# Patient Record
Sex: MALE | Race: WHITE | Employment: UNEMPLOYED | URBAN - METROPOLITAN AREA
[De-identification: names, ages, dates, MRNs, and addresses within clinical notes are randomized per-mention and may not be internally consistent; named-entity substitution may affect disease eponyms.]

---

## 2020-06-22 ENCOUNTER — HOSPITAL ENCOUNTER (EMERGENCY)
Facility: CLINIC | Age: 30
Discharge: HOME | End: 2020-06-22
Attending: FAMILY MEDICINE | Admitting: FAMILY MEDICINE
Payer: COMMERCIAL

## 2020-06-22 VITALS
DIASTOLIC BLOOD PRESSURE: 95 MMHG | HEART RATE: 93 BPM | OXYGEN SATURATION: 96 % | SYSTOLIC BLOOD PRESSURE: 119 MMHG | RESPIRATION RATE: 18 BRPM | WEIGHT: 155 LBS | BODY MASS INDEX: 21.02 KG/M2 | TEMPERATURE: 98.1 F

## 2020-06-22 DIAGNOSIS — K04.7 TOOTH ABSCESS: ICD-10-CM

## 2020-06-22 PROCEDURE — 99284 EMERGENCY DEPT VISIT MOD MDM: CPT | Mod: Z6 | Performed by: FAMILY MEDICINE

## 2020-06-22 PROCEDURE — 99282 EMERGENCY DEPT VISIT SF MDM: CPT | Performed by: FAMILY MEDICINE

## 2020-06-22 RX ORDER — IBUPROFEN 200 MG
600 TABLET ORAL EVERY 6 HOURS PRN
Refills: 0 | COMMUNITY
Start: 2020-06-22

## 2020-06-22 RX ORDER — ACETAMINOPHEN 500 MG
1000 TABLET ORAL EVERY 6 HOURS PRN
Qty: 100 TABLET | Refills: 11 | COMMUNITY
Start: 2020-06-22

## 2020-06-22 RX ORDER — OXYCODONE HYDROCHLORIDE 5 MG/1
5-10 TABLET ORAL EVERY 4 HOURS PRN
Qty: 12 TABLET | Refills: 0 | Status: SHIPPED | OUTPATIENT
Start: 2020-06-22

## 2020-06-22 NOTE — ED AVS SNAPSHOT
[facility] Emergency Department  [address] [place] [address]  Phone:  [phone]  Fax:  [phone]                                    Paul Guzmán   MRN: [id number]    Department:  [facility] Emergency Department   Date of Visit:  6/22/2020           After Visit Summary Signature Page    I have received my discharge instructions, and my questions have been answered. I have discussed any challenges I see with this plan with the nurse or doctor.    ..........................................................................................................................................  Patient/Patient Representative Signature      ..........................................................................................................................................  Patient Representative Print Name and Relationship to Patient    ..................................................               ................................................  Date                                   Time    ..........................................................................................................................................  Reviewed by Signature/Title    ...................................................              ..............................................  Date                                               Time          22EPIC Rev 08/18

## 2020-06-23 NOTE — ED NOTES
Reviewed discharge instruction, verbalized understanding. No questions or concerns. Meds reviewed.

## 2020-06-23 NOTE — DISCHARGE INSTRUCTIONS
Take the Augmentin twice a day as directed for the infection.  Ibuprofen 600 mg and Tylenol 1000 mg every 6 hours as needed for pain.  You can take them at the same time.  Take with food so the Ibuprofen doesn't upset your stomach.  You may use the oxycodone sparingly for more severe pain.  All narcotics can cause drowsiness and constipation so be careful to avoid those problems.  Take an OTC stool softener if needed.  Narcotics also have addictive potential and can actually make you more sensitive to pain in as little as 3 days so only use them for a very short time.  You should not drive or operate machinery while taking narcotics.    See a dentist as soon as possible.  Return to the ED if worse/concerns.  It was nice visiting with you tonight.   I hope you feel better soon.     Thank you for choosing [facility]. We appreciate the opportunity to meet your urgent medical needs. Please let us know if we could have done anything to make your stay more satisfying.    After discharge, please closely monitor for any new or worsening symptoms. Return to the Emergency Department if you develop any acute worsening signs or symptoms.    If you had lab work, cultures or imaging studies done during your stay, the final results may still be pending. We will call you if your plan of care needs to change. However, if you are not improving as expected, please follow up with your primary care provider or clinic.     Start any prescription medications that were prescribed to you and take them as directed.     Please see additional handouts that may be pertinent to your condition.

## 2020-06-23 NOTE — ED PROVIDER NOTES
History     Chief Complaint   Patient presents with     Facial Swelling     HPI  Paul Guzmán is a 30 year old male who presents to the ED this evening with right lower jaw swelling.  He noticed that yesterday morning.  It was worse this morning and has progressed throughout the day.  His teeth are in poor repair.  He tried calling to get a dentist appointment all day long but was unsuccessful.  He denies any fevers chills or sweats.  No drainage.  Has not been able to sleep tonight because of the discomfort.  Is hoping to get started on an antibiotic.  Has been icing all day.      Allergies:  Allergies   Allergen Reactions     No Known Drug Allergies        Problem List:    Patient Active Problem List    Diagnosis Date Noted     Calcaneus fracture 05/22/2013     Priority: Medium     CARDIOVASCULAR SCREENING; LDL GOAL LESS THAN 160 10/31/2010     Priority: Medium     Closed fracture of shaft of clavicle 05/25/2006     Priority: Medium     Closed fracture of five ribs 05/25/2006     Priority: Medium     Juvenile osteochondrosis of lower extremity, excluding foot 10/27/2005     Priority: Medium     Other behavioral problems 02/25/2004     Priority: Medium     Problem list name updated by automated process. Provider to review and confirm       Attention deficit hyperactivity disorder (ADHD) 03/17/2003     Priority: Medium     Problem list name updated by automated process. Provider to review          Past Medical History:    Past Medical History:   Diagnosis Date     Closed fracture of rib(s), unspecified      Unspecified part of closed fracture of clavicle 05/22/06       Past Surgical History:    Past Surgical History:   Procedure Laterality Date     C REMOVAL ADENOIDS,PRIMARY,<11 Y/O       HC CREATE EARDRUM OPENING,GEN ANESTH      P.E. Tubes       Family History:    No family history on file.    Social History:  Marital Status:  Single [1]  Social History     Tobacco Use     Smoking status: Current Every Day  Smoker     Packs/day: 0.50     Smokeless tobacco: Never Used   Substance Use Topics     Alcohol use: Yes     Alcohol/week: 0.0 standard drinks     Types: 3 - 4 Cans of beer per week     Drug use: No        Medications:    acetaminophen (TYLENOL) 500 MG tablet  amoxicillin-clavulanate (AUGMENTIN) 875-125 MG tablet  ibuprofen (ADVIL/MOTRIN) 200 MG tablet  oxyCODONE (ROXICODONE) 5 MG tablet          Review of Systems   All other systems reviewed and are negative.      Physical Exam   BP: (!) 119/95  Pulse: 93  Temp: 98.1  F (36.7  C)  Resp: 18  Weight: 70.3 kg (155 lb)  SpO2: 96 %      Physical Exam  Constitutional:       General: He is in acute distress (mild).   HENT:      Head:      Jaw: Swelling (right lower, localized, not over salivary glands) present.      Salivary Glands: Right salivary gland is not diffusely enlarged or tender. Left salivary gland is not diffusely enlarged or tender.        Mouth/Throat:      Dentition: Dental caries (extensive, especially at gum line) and dental abscesses (tender/swelling right lower) present.   Pulmonary:      Effort: Pulmonary effort is normal. No respiratory distress.   Skin:     General: Skin is warm and dry.   Neurological:      General: No focal deficit present.      Mental Status: He is alert and oriented to person, place, and time.   Psychiatric:         Mood and Affect: Mood normal.         Behavior: Behavior normal.         ED Course  (with Medical Decision Making)      30-year-old gentleman with right lower jaw swelling over the past couple of days.  Has extensive dental decay especially at the gumline.  No fevers or drainage.  I see nothing on exam that is pointing or would allow for drainage.  Does have tenderness and swelling over the right lower jaw.  Nothing that involves any of the salivary glands.  Suspect this is dental in origin.  We will start him on Augmentin.  Tylenol/ibuprofen for pain.  Limited prescription of oxycodone for more severe pain and I  encouraged him to get into see a dentist as soon as possible.  He could also consider calling the [employer] [facility] [employer] [facility] to see if they have any openings.              Procedures               Critical Care time:  none               No results found for this or any previous visit (from the past 24 hour(s)).    Medications - No data to display    Assessments & Plan     I have reviewed the nursing notes.    I have reviewed the findings, diagnosis, plan and need for follow up with the patient.       New Prescriptions    ACETAMINOPHEN (TYLENOL) 500 MG TABLET    Take 2 tablets (1,000 mg) by mouth every 6 hours as needed for pain or fever    AMOXICILLIN-CLAVULANATE (AUGMENTIN) 875-125 MG TABLET    Take 1 tablet by mouth 2 times daily for 10 days    IBUPROFEN (ADVIL/MOTRIN) 200 MG TABLET    Take 3 tablets (600 mg) by mouth every 6 hours as needed for pain or fever    OXYCODONE (ROXICODONE) 5 MG TABLET    Take 1-2 tablets (5-10 mg) by mouth every 4 hours as needed for pain       Final diagnoses:   Tooth abscess - right lower       6/22/2020   [facility] EMERGENCY DEPARTMENT     Yevgeniy Davenport MD  06/22/20 [id number]